# Patient Record
Sex: FEMALE | Race: WHITE | NOT HISPANIC OR LATINO | Employment: OTHER | URBAN - METROPOLITAN AREA
[De-identification: names, ages, dates, MRNs, and addresses within clinical notes are randomized per-mention and may not be internally consistent; named-entity substitution may affect disease eponyms.]

---
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
## 2023-09-11 PROBLEM — R40.1 CLOUDED CONSCIOUSNESS: Status: ACTIVE | Noted: 2023-09-11

## 2023-09-11 PROBLEM — R93.3 IMAGING OF GASTROINTESTINAL TRACT ABNORMAL: Status: ACTIVE | Noted: 2023-09-11

## 2023-09-11 PROBLEM — M77.12 LATERAL EPICONDYLITIS OF LEFT ELBOW: Status: ACTIVE | Noted: 2022-10-25

## 2023-09-11 PROBLEM — R42 DIZZY: Status: ACTIVE | Noted: 2022-08-11

## 2023-09-11 PROBLEM — K21.9 GASTROESOPHAGEAL REFLUX DISEASE: Status: ACTIVE | Noted: 2023-09-11

## 2023-09-11 PROBLEM — I83.93 VARICOSE VEINS OF LEGS: Status: ACTIVE | Noted: 2019-05-29

## 2023-09-11 PROBLEM — K29.90 GASTRODUODENITIS: Status: ACTIVE | Noted: 2023-09-11

## 2023-09-11 PROBLEM — J06.9 ACUTE URI: Status: RESOLVED | Noted: 2018-11-08 | Resolved: 2023-09-11

## 2023-09-11 PROBLEM — J06.9 VIRAL UPPER RESPIRATORY INFECTION: Status: RESOLVED | Noted: 2020-11-12 | Resolved: 2023-09-11

## 2023-09-11 PROBLEM — K59.00 CONSTIPATION: Status: ACTIVE | Noted: 2021-06-17

## 2023-09-11 PROBLEM — J98.11 ATELECTASIS: Status: ACTIVE | Noted: 2023-09-11

## 2023-09-11 PROBLEM — J00 NASOPHARYNGITIS: Status: ACTIVE | Noted: 2020-02-27

## 2023-09-11 PROBLEM — D13.1 BENIGN NEOPLASM OF STOMACH: Status: ACTIVE | Noted: 2023-09-11

## 2023-09-11 PROBLEM — C50.419 MALIGNANT NEOPLASM OF UPPER-OUTER QUADRANT OF FEMALE BREAST (MULTI): Status: ACTIVE | Noted: 2023-09-11

## 2023-09-11 PROBLEM — D12.5 BENIGN NEOPLASM OF SIGMOID COLON: Status: ACTIVE | Noted: 2023-09-11

## 2023-09-11 PROBLEM — Z86.010 HISTORY OF COLONIC POLYPS: Status: ACTIVE | Noted: 2023-09-11

## 2023-09-11 PROBLEM — K44.9 DIAPHRAGMATIC HERNIA: Status: ACTIVE | Noted: 2023-09-11

## 2023-09-11 PROBLEM — M67.441 GANGLION CYST OF TENDON SHEATH OF RIGHT HAND: Status: ACTIVE | Noted: 2021-10-28

## 2023-09-11 PROBLEM — E78.5 HYPERLIPIDEMIA: Status: ACTIVE | Noted: 2019-03-20

## 2023-09-11 PROBLEM — I48.91 RAPID ATRIAL FIBRILLATION (MULTI): Status: RESOLVED | Noted: 2023-09-11 | Resolved: 2023-09-11

## 2023-09-11 PROBLEM — L82.1 SEBORRHEIC KERATOSES: Status: ACTIVE | Noted: 2018-11-08

## 2023-09-11 PROBLEM — H57.9 LEFT EYE COMPLAINT: Status: ACTIVE | Noted: 2020-08-08

## 2023-09-11 PROBLEM — S61.439A: Status: RESOLVED | Noted: 2018-08-27 | Resolved: 2023-09-11

## 2023-09-11 PROBLEM — R10.9 ABDOMINAL PAIN: Status: ACTIVE | Noted: 2023-09-11

## 2023-09-11 PROBLEM — E55.9 VITAMIN D DEFICIENCY: Status: ACTIVE | Noted: 2023-09-11

## 2023-09-11 PROBLEM — M25.521 RIGHT ELBOW PAIN: Status: RESOLVED | Noted: 2019-10-03 | Resolved: 2023-09-11

## 2023-09-11 PROBLEM — T78.40XA ALLERGIES: Status: ACTIVE | Noted: 2020-03-18

## 2023-09-11 PROBLEM — J96.00 ACUTE RESPIRATORY FAILURE (MULTI): Status: RESOLVED | Noted: 2023-09-11 | Resolved: 2023-09-11

## 2023-09-11 PROBLEM — R32 URINARY INCONTINENCE: Status: ACTIVE | Noted: 2019-05-30

## 2023-09-11 PROBLEM — S61.432A PUNCTURE WOUND OF LEFT HAND: Status: RESOLVED | Noted: 2018-08-27 | Resolved: 2023-09-11

## 2023-09-11 PROBLEM — I48.0 PAROXYSMAL ATRIAL FIBRILLATION (MULTI): Status: ACTIVE | Noted: 2023-09-11

## 2023-09-11 PROBLEM — H10.31 ACUTE CONJUNCTIVITIS, RIGHT EYE: Status: RESOLVED | Noted: 2019-10-12 | Resolved: 2023-09-11

## 2023-09-11 PROBLEM — J20.9 ACUTE BRONCHITIS: Status: RESOLVED | Noted: 2019-04-08 | Resolved: 2023-09-11

## 2023-09-11 PROBLEM — L84 CORNS/CALLOSITIES: Status: RESOLVED | Noted: 2023-04-13 | Resolved: 2023-09-11

## 2023-09-11 PROBLEM — R22.41 LOCALIZED SWELLING OF RIGHT LOWER LEG: Status: ACTIVE | Noted: 2021-04-24

## 2023-09-11 PROBLEM — R06.09 DYSPNEA ON EXERTION: Status: ACTIVE | Noted: 2023-09-11

## 2023-09-11 PROBLEM — I10 ESSENTIAL (PRIMARY) HYPERTENSION: Status: ACTIVE | Noted: 2018-10-10

## 2023-09-11 PROBLEM — H81.10 BENIGN POSITIONAL VERTIGO: Status: ACTIVE | Noted: 2021-01-25

## 2023-09-11 PROBLEM — H69.90 EUSTACHIAN TUBE DYSFUNCTION: Status: ACTIVE | Noted: 2019-06-14

## 2023-09-11 PROBLEM — E21.3 HYPERPARATHYROIDISM (MULTI): Status: ACTIVE | Noted: 2023-09-11

## 2023-09-11 PROBLEM — K29.71 HEMORRHAGIC GASTRITIS: Status: ACTIVE | Noted: 2023-09-11

## 2023-09-11 PROBLEM — Z86.0100 HISTORY OF COLONIC POLYPS: Status: ACTIVE | Noted: 2023-09-11
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
## 2023-09-11 PROBLEM — J30.1 ALLERGIC RHINITIS DUE TO POLLEN: Status: ACTIVE | Noted: 2020-06-11

## 2023-09-11 PROBLEM — R53.82 CHRONIC FATIGUE: Status: ACTIVE | Noted: 2023-09-11

## 2023-09-11 PROBLEM — D12.8 BENIGN NEOPLASM OF RECTUM: Status: ACTIVE | Noted: 2023-09-11

## 2023-09-11 PROBLEM — H35.30 AGE-RELATED MACULAR DEGENERATION: Status: ACTIVE | Noted: 2023-09-11

## 2023-09-11 PROBLEM — J90 BILATERAL PLEURAL EFFUSION: Status: ACTIVE | Noted: 2023-09-11

## 2023-09-11 PROBLEM — R11.0 NAUSEA: Status: ACTIVE | Noted: 2021-01-25

## 2023-09-11 PROBLEM — M79.661 PAIN IN RIGHT LOWER LEG: Status: ACTIVE | Noted: 2021-04-24

## 2023-09-11 PROBLEM — M20.42 HAMMERTOE OF SECOND TOE OF LEFT FOOT: Status: ACTIVE | Noted: 2023-04-13

## 2023-09-11 PROBLEM — R05.9 COUGH: Status: RESOLVED | Noted: 2019-04-07 | Resolved: 2023-09-11

## 2023-09-11 PROBLEM — U07.1 COVID-19 VIRUS INFECTION: Status: RESOLVED | Noted: 2022-08-01 | Resolved: 2023-09-11

## 2023-09-11 RX ORDER — LORATADINE 10 MG/1
10 TABLET ORAL DAILY
COMMUNITY
Start: 2022-10-25

## 2023-09-11 RX ORDER — OMEPRAZOLE 40 MG/1
1 CAPSULE, DELAYED RELEASE ORAL DAILY
COMMUNITY
End: 2023-12-21

## 2023-09-11 RX ORDER — GINGER ROOT 550 MG
1 CAPSULE ORAL AS NEEDED
COMMUNITY

## 2023-09-11 RX ORDER — FERROUS SULFATE, DRIED 160(50) MG
TABLET, EXTENDED RELEASE ORAL
COMMUNITY
Start: 2018-08-27

## 2023-09-11 RX ORDER — HYDROCHLOROTHIAZIDE 25 MG/1
1 TABLET ORAL EVERY MORNING
COMMUNITY
End: 2023-12-21

## 2023-09-11 RX ORDER — AMLODIPINE BESYLATE 5 MG/1
1 TABLET ORAL DAILY
COMMUNITY
Start: 2020-02-20

## 2023-09-11 RX ORDER — GUAIFENESIN 600 MG/1
1 TABLET, EXTENDED RELEASE ORAL EVERY 12 HOURS PRN
COMMUNITY

## 2023-09-11 RX ORDER — GLUCOSAMINE/CHONDR SU A SOD 750-600 MG
1 TABLET ORAL
COMMUNITY

## 2023-09-11 RX ORDER — ONDANSETRON 4 MG/1
1 TABLET, FILM COATED ORAL EVERY 8 HOURS PRN
COMMUNITY
End: 2023-12-21 | Stop reason: ALTCHOICE

## 2023-09-11 RX ORDER — DIAPER,BRIEF,INFANT-TODD,DISP
1 EACH MISCELLANEOUS 2 TIMES DAILY
COMMUNITY
End: 2023-12-21 | Stop reason: SDUPTHER

## 2023-09-11 RX ORDER — CALCIUM CITRATE/VITAMIN D3 315MG-6.25
1 TABLET ORAL 2 TIMES DAILY
COMMUNITY
End: 2023-12-21 | Stop reason: SDUPTHER

## 2023-12-21 ENCOUNTER — OFFICE VISIT (OUTPATIENT)
Dept: PRIMARY CARE | Facility: CLINIC | Age: 78
End: 2023-12-21
Payer: MEDICARE

## 2023-12-21 VITALS
HEART RATE: 72 BPM | SYSTOLIC BLOOD PRESSURE: 146 MMHG | OXYGEN SATURATION: 95 % | BODY MASS INDEX: 24.35 KG/M2 | DIASTOLIC BLOOD PRESSURE: 70 MMHG | TEMPERATURE: 97.1 F | WEIGHT: 131 LBS

## 2023-12-21 DIAGNOSIS — J06.9 VIRAL UPPER RESPIRATORY TRACT INFECTION: Primary | ICD-10-CM

## 2023-12-21 PROCEDURE — 1160F RVW MEDS BY RX/DR IN RCRD: CPT | Performed by: FAMILY MEDICINE

## 2023-12-21 PROCEDURE — 99213 OFFICE O/P EST LOW 20 MIN: CPT | Performed by: FAMILY MEDICINE

## 2023-12-21 PROCEDURE — 1126F AMNT PAIN NOTED NONE PRSNT: CPT | Performed by: FAMILY MEDICINE

## 2023-12-21 PROCEDURE — 1036F TOBACCO NON-USER: CPT | Performed by: FAMILY MEDICINE

## 2023-12-21 PROCEDURE — 3077F SYST BP >= 140 MM HG: CPT | Performed by: FAMILY MEDICINE

## 2023-12-21 PROCEDURE — 1159F MED LIST DOCD IN RCRD: CPT | Performed by: FAMILY MEDICINE

## 2023-12-21 PROCEDURE — 3078F DIAST BP <80 MM HG: CPT | Performed by: FAMILY MEDICINE

## 2023-12-21 ASSESSMENT — PAIN SCALES - GENERAL: PAINLEVEL: 0-NO PAIN

## 2023-12-21 ASSESSMENT — PATIENT HEALTH QUESTIONNAIRE - PHQ9
SUM OF ALL RESPONSES TO PHQ9 QUESTIONS 1 AND 2: 0
2. FEELING DOWN, DEPRESSED OR HOPELESS: NOT AT ALL
1. LITTLE INTEREST OR PLEASURE IN DOING THINGS: NOT AT ALL

## 2023-12-21 NOTE — PATIENT INSTRUCTIONS
You have an early onset cold.  I recommend Mucinex and Tylenol as needed.  You should increase your intake of clear liquids.

## 2023-12-21 NOTE — PROGRESS NOTES
Subjective   Patient ID: Gloria Echavarria is a 78 y.o. female who presents for Sick Visit (Pt c/o cough, sore throat, chest congestion and discomfort since last night.).    HPI   Gloria presents with sore throat that started last night and a dry cough.  Her sore throat feels better today.  She now has some nasal congestion and some tightness in her chest.  She has not taken anything for her symptoms.  She does not smoke and has no history of asthma.      Review of Systems  She denies fever or chills.  No loss of sense of taste or smell.  No decreased appetite.  Her cough is nonproductive.  No shortness of breath.  Objective   /70   Pulse 72   Temp 36.2 °C (97.1 °F)   Wt 59.4 kg (131 lb)   SpO2 95%   BMI 24.35 kg/m²     Physical Exam  She is alert and in no distress.  Ears TMs are clear.  Nose is congested with a scant amount of clear rhinorrhea.  Throat is noninjected and without exudate.  Neck is supple without adenopathy.  Lungs are clear.  Heart is regular in rhythm and rate.  Skin shows normal turgor and mucous membranes are moist.    Assessment/Plan   Diagnoses and all orders for this visit:  Viral upper respiratory tract infection  She has an early viral type infection.  It is very early in the course.  I warned her her symptoms may get a little worse before they get better.  Symptoms will likely last about a week.  She may use Tylenol and Mucinex as needed.  She should increase her intake of clear liquids.

## 2024-06-04 ENCOUNTER — TELEPHONE (OUTPATIENT)
Dept: PRIMARY CARE | Facility: CLINIC | Age: 79
End: 2024-06-04
Payer: MEDICARE

## 2024-06-04 DIAGNOSIS — I10 ESSENTIAL (PRIMARY) HYPERTENSION: ICD-10-CM

## 2024-06-04 DIAGNOSIS — E55.9 VITAMIN D DEFICIENCY: ICD-10-CM

## 2024-06-04 DIAGNOSIS — E78.5 HYPERLIPIDEMIA, UNSPECIFIED HYPERLIPIDEMIA TYPE: ICD-10-CM

## 2024-06-04 DIAGNOSIS — Z12.31 VISIT FOR SCREENING MAMMOGRAM: Primary | ICD-10-CM

## 2024-06-12 ENCOUNTER — APPOINTMENT (OUTPATIENT)
Dept: RADIOLOGY | Facility: CLINIC | Age: 79
End: 2024-06-12
Payer: MEDICARE

## 2024-06-12 ENCOUNTER — TELEPHONE (OUTPATIENT)
Dept: PRIMARY CARE | Facility: CLINIC | Age: 79
End: 2024-06-12
Payer: MEDICARE

## 2024-06-12 ENCOUNTER — HOSPITAL ENCOUNTER (OUTPATIENT)
Dept: RADIOLOGY | Facility: CLINIC | Age: 79
Discharge: HOME | End: 2024-06-12
Payer: MEDICARE

## 2024-06-12 VITALS — HEIGHT: 62 IN | BODY MASS INDEX: 23.92 KG/M2 | WEIGHT: 130 LBS

## 2024-06-12 DIAGNOSIS — Z12.31 VISIT FOR SCREENING MAMMOGRAM: ICD-10-CM

## 2024-06-12 NOTE — TELEPHONE ENCOUNTER
Patient came in because  refused to perform her mammogram since she had a double mastectomy - they told her this is a new  policy. They told her she could check with her doctor to see if anything could be done in order for her to get the mammogram. Please advise.  Patient phone #: [phone]

## 2024-06-13 DIAGNOSIS — Z85.3 HISTORY OF BREAST CANCER: Primary | ICD-10-CM

## 2024-06-14 ENCOUNTER — TELEPHONE (OUTPATIENT)
Dept: PRIMARY CARE | Facility: CLINIC | Age: 79
End: 2024-06-14
Payer: MEDICARE

## 2024-06-14 NOTE — TELEPHONE ENCOUNTER
Patient came in the other day about her canceled mammogram. She keeps getting the automated calls about scheduling the mammogram and she was wondering if she should wait to schedule until Dr. De La O's office gets back to her, or if she should reschedule it now?    Contact: [phone]

## 2024-06-25 ENCOUNTER — HOSPITAL ENCOUNTER (OUTPATIENT)
Dept: RADIOLOGY | Facility: HOSPITAL | Age: 79
Discharge: HOME | End: 2024-06-25
Payer: MEDICARE

## 2024-06-25 VITALS — HEIGHT: 62 IN | WEIGHT: 130 LBS | BODY MASS INDEX: 23.92 KG/M2

## 2024-06-25 PROCEDURE — 77067 SCR MAMMO BI INCL CAD: CPT | Performed by: STUDENT IN AN ORGANIZED HEALTH CARE EDUCATION/TRAINING PROGRAM

## 2024-06-25 PROCEDURE — 77063 BREAST TOMOSYNTHESIS BI: CPT | Performed by: STUDENT IN AN ORGANIZED HEALTH CARE EDUCATION/TRAINING PROGRAM

## 2024-06-25 PROCEDURE — 77067 SCR MAMMO BI INCL CAD: CPT

## 2024-07-09 NOTE — PROGRESS NOTES
Subjective      No chief complaint on file.         She was seen a year ago history of hypertension and she had atrial fibrillation.  The atrial fibrillation was after hiatal hernia surgery in 2018.   In 2018 she had a very extensive repair of a hiatal hernia and had to go into the thoracic cavity.  She did go into atrial fibrillation and converted on Cardizem drip.  Her [id number] score is 5 recommend she be on anticoagulation at that time.  Surgery felt at that time she could not tolerate it.  She was monitored for 3 weeks and had no further episodes.  It was felt that the atrial fibrillation was secondary to inflammation she had been placed on amiodarone at the time and this has been stopped.  She has had no further episodes of the atrial fibrillation since then.  Is been felt that she does not need long-term anticoagulation unless she has another episodes of atrial fibrillation.  She is having some problems with diarrhea.  Otherwise is doing well.  She is not complaining of chest discomfort.  No complaints of PND or orthopnea.  The legs are not swelling on her.  NO palpitaions.  No dizziness or lightheadedness.  The BP is doing well at home and is up today and is upset today           Review of Systems   Constitutional: Negative. Negative for chills and fever.   HENT: Negative.     Eyes: Negative.    Cardiovascular:  Negative for near-syncope.   Respiratory: Negative.  Negative for cough.    Endocrine: Negative.    Skin: Negative.    Musculoskeletal:  Positive for back pain. Negative for falls.   Gastrointestinal:  Positive for diarrhea.   Genitourinary: Negative.    Neurological: Negative.    All other systems reviewed and are negative.       Past Surgical History:   Procedure Laterality Date    HYSTERECTOMY      MASTECTOMY Bilateral     left breast cancer  mastectomy with implants        Active Ambulatory Problems     Diagnosis Date Noted    Hemorrhagic gastritis 09/11/2023    Abdominal pain 09/11/2023     Age-related macular degeneration 09/11/2023    Allergic rhinitis due to pollen 06/11/2020    Allergies 03/18/2020    Atelectasis 09/11/2023    Benign neoplasm of rectum 09/11/2023    Benign neoplasm of sigmoid colon 09/11/2023    Benign neoplasm of stomach 09/11/2023    Benign positional vertigo 01/25/2021    Bilateral pleural effusion 09/11/2023    Chronic fatigue 09/11/2023    Clouded consciousness 09/11/2023    Constipation 06/17/2021    Nausea 01/25/2021    Seborrheic keratoses 11/08/2018    Diaphragmatic hernia 09/11/2023    Dizzy 08/11/2022    Dyspnea on exertion 09/11/2023    Essential (primary) hypertension 10/10/2018    Eustachian tube dysfunction 06/14/2019    Ganglion cyst of tendon sheath of right hand 10/28/2021    Gastroesophageal reflux disease 09/11/2023    Gastroduodenitis 09/11/2023    Hammertoe of second toe of left foot 04/13/2023    History of colonic polyps 09/11/2023    Hyperlipidemia 03/20/2019    Hyperparathyroidism (Multi) 09/11/2023    Imaging of gastrointestinal tract abnormal 09/11/2023    Lateral epicondylitis of left elbow 10/25/2022    Left eye complaint 08/08/2020    Localized swelling of right lower leg 04/24/2021    Malignant neoplasm of upper-outer quadrant of female breast (Multi) 09/11/2023    Nasopharyngitis 02/27/2020    Pain in right lower leg 04/24/2021    Paroxysmal atrial fibrillation (Multi) 09/11/2023    Urinary incontinence 05/30/2019    Varicose veins of legs 05/29/2019    Vitamin D deficiency 09/11/2023     Resolved Ambulatory Problems     Diagnosis Date Noted    Acute bronchitis 04/08/2019    Acute respiratory failure (Multi) 09/11/2023    Acute conjunctivitis, right eye 10/12/2019    Acute URI 11/08/2018    Viral upper respiratory infection 11/12/2020    Bitten by cat, initial encounter 08/27/2018    Cat bite 08/27/2018    Corns/callosities 04/13/2023    COVID-19 virus infection 08/01/2022    Cough 04/07/2019    Rapid atrial fibrillation (Multi) 09/11/2023     "Puncture wound of hand without foreign body 08/27/2018    Puncture wound of left hand 08/27/2018    Right elbow pain 10/03/2019     Past Medical History:   Diagnosis Date    Breast cancer (Multi)         Visit Vitals  OB Status Hysterectomy   Smoking Status Never        Objective     Constitutional:       Appearance: Healthy appearance.   Neck:      Vascular: No JVR.   Pulmonary:      Effort: Pulmonary effort is normal.      Breath sounds: Normal breath sounds.   Cardiovascular:      PMI at left midclavicular line. Normal rate. Regular rhythm. Normal S1. Normal S2.       Murmurs: There is no murmur.      No gallop.  No click. No rub.   Pulses:     Intact distal pulses.   Abdominal:      Palpations: Abdomen is soft.   Musculoskeletal: Normal range of motion. Skin:     General: Skin is warm and dry.   Neurological:      General: No focal deficit present.            Lab Review:         Lab Results   Component Value Date    CHOL 195 07/17/2023    CHOL 195 06/21/2022    CHOL 196 06/10/2021     Lab Results   Component Value Date    HDL 86 07/17/2023    HDL 84 06/21/2022    HDL 84 06/10/2021     Lab Results   Component Value Date    LDLCALC 92 07/17/2023    LDLCALC 93 06/21/2022    LDLCALC 84 06/10/2021     Lab Results   Component Value Date    TRIG 86 07/17/2023    TRIG 92 06/21/2022    TRIG 142 06/10/2021     No components found for: \"CHOLHDL\"     Assessment/Plan     No problem-specific Assessment & Plan notes found for this encounter.     "

## 2024-07-11 ENCOUNTER — OFFICE VISIT (OUTPATIENT)
Dept: CARDIOLOGY | Facility: CLINIC | Age: 79
End: 2024-07-11
Payer: MEDICARE

## 2024-07-11 VITALS
WEIGHT: 130 LBS | HEART RATE: 80 BPM | OXYGEN SATURATION: 98 % | DIASTOLIC BLOOD PRESSURE: 70 MMHG | SYSTOLIC BLOOD PRESSURE: 148 MMHG | BODY MASS INDEX: 23.78 KG/M2

## 2024-07-11 DIAGNOSIS — I48.0 PAROXYSMAL ATRIAL FIBRILLATION (MULTI): ICD-10-CM

## 2024-07-11 DIAGNOSIS — I10 ESSENTIAL (PRIMARY) HYPERTENSION: Primary | ICD-10-CM

## 2024-07-11 PROCEDURE — 1159F MED LIST DOCD IN RCRD: CPT | Performed by: INTERNAL MEDICINE

## 2024-07-11 PROCEDURE — 3077F SYST BP >= 140 MM HG: CPT | Performed by: INTERNAL MEDICINE

## 2024-07-11 PROCEDURE — 99213 OFFICE O/P EST LOW 20 MIN: CPT | Performed by: INTERNAL MEDICINE

## 2024-07-11 PROCEDURE — 1160F RVW MEDS BY RX/DR IN RCRD: CPT | Performed by: INTERNAL MEDICINE

## 2024-07-11 PROCEDURE — 1126F AMNT PAIN NOTED NONE PRSNT: CPT | Performed by: INTERNAL MEDICINE

## 2024-07-11 PROCEDURE — 1036F TOBACCO NON-USER: CPT | Performed by: INTERNAL MEDICINE

## 2024-07-11 PROCEDURE — 3078F DIAST BP <80 MM HG: CPT | Performed by: INTERNAL MEDICINE

## 2024-07-11 RX ORDER — AMLODIPINE BESYLATE 5 MG/1
5 TABLET ORAL DAILY
Qty: 90 TABLET | Refills: 3 | Status: SHIPPED | OUTPATIENT
Start: 2024-07-11 | End: 2025-07-11

## 2024-07-11 ASSESSMENT — ENCOUNTER SYMPTOMS
OCCASIONAL FEELINGS OF UNSTEADINESS: 0
LOSS OF SENSATION IN FEET: 0
FALLS: 0
BACK PAIN: 1
NEUROLOGICAL NEGATIVE: 1
ENDOCRINE NEGATIVE: 1
COUGH: 0
NEAR-SYNCOPE: 0
RESPIRATORY NEGATIVE: 1
DIARRHEA: 1
CHILLS: 0
DEPRESSION: 0
EYES NEGATIVE: 1
FEVER: 0
CONSTITUTIONAL NEGATIVE: 1

## 2024-07-11 ASSESSMENT — PATIENT HEALTH QUESTIONNAIRE - PHQ9
1. LITTLE INTEREST OR PLEASURE IN DOING THINGS: NOT AT ALL
SUM OF ALL RESPONSES TO PHQ9 QUESTIONS 1 AND 2: 0
2. FEELING DOWN, DEPRESSED OR HOPELESS: NOT AT ALL

## 2024-07-11 ASSESSMENT — PAIN SCALES - GENERAL: PAINLEVEL: 0-NO PAIN

## 2024-07-11 NOTE — ASSESSMENT & PLAN NOTE
She is doing well and no angina and no chf.  The BP is up today and is under stress.  Will check at home and in 2 weeks.  She is to have a routine physical next month and if still up need to adjust the meds.

## 2024-07-11 NOTE — ASSESSMENT & PLAN NOTE
Has not felt the afib since 2018 and have no plan to start anticoagulation mario with her GI problems

## 2024-07-23 ENCOUNTER — LAB (OUTPATIENT)
Dept: LAB | Facility: LAB | Age: 79
End: 2024-07-23
Payer: MEDICARE

## 2024-07-23 DIAGNOSIS — I10 ESSENTIAL (PRIMARY) HYPERTENSION: ICD-10-CM

## 2024-07-23 DIAGNOSIS — E78.5 HYPERLIPIDEMIA, UNSPECIFIED HYPERLIPIDEMIA TYPE: ICD-10-CM

## 2024-07-23 DIAGNOSIS — E55.9 VITAMIN D DEFICIENCY: ICD-10-CM

## 2024-07-23 LAB
25(OH)D3 SERPL-MCNC: 39 NG/ML (ref 30–100)
ALBUMIN SERPL BCP-MCNC: 4.1 G/DL (ref 3.4–5)
ALP SERPL-CCNC: 80 U/L (ref 33–136)
ALT SERPL W P-5'-P-CCNC: 24 U/L (ref 7–45)
ANION GAP SERPL CALC-SCNC: 12 MMOL/L (ref 10–20)
APPEARANCE UR: ABNORMAL
AST SERPL W P-5'-P-CCNC: 28 U/L (ref 9–39)
BACTERIA #/AREA URNS AUTO: ABNORMAL /HPF
BASOPHILS # BLD AUTO: 0.04 X10*3/UL (ref 0–0.1)
BASOPHILS NFR BLD AUTO: 0.6 %
BILIRUB SERPL-MCNC: 0.6 MG/DL (ref 0–1.2)
BILIRUB UR STRIP.AUTO-MCNC: NEGATIVE MG/DL
BUN SERPL-MCNC: 15 MG/DL (ref 6–23)
CALCIUM SERPL-MCNC: 9.6 MG/DL (ref 8.6–10.3)
CHLORIDE SERPL-SCNC: 106 MMOL/L (ref 98–107)
CHOLEST SERPL-MCNC: 193 MG/DL (ref 0–199)
CHOLESTEROL/HDL RATIO: 2.4
CO2 SERPL-SCNC: 26 MMOL/L (ref 21–32)
COLOR UR: ABNORMAL
CREAT SERPL-MCNC: 0.67 MG/DL (ref 0.5–1.05)
CREAT UR-MCNC: 121.1 MG/DL (ref 20–320)
EGFRCR SERPLBLD CKD-EPI 2021: 90 ML/MIN/1.73M*2
EOSINOPHIL # BLD AUTO: 0.07 X10*3/UL (ref 0–0.4)
EOSINOPHIL NFR BLD AUTO: 1 %
ERYTHROCYTE [DISTWIDTH] IN BLOOD BY AUTOMATED COUNT: 13.9 % (ref 11.5–14.5)
GLUCOSE SERPL-MCNC: 96 MG/DL (ref 74–99)
GLUCOSE UR STRIP.AUTO-MCNC: NORMAL MG/DL
HCT VFR BLD AUTO: 45.7 % (ref 36–46)
HDLC SERPL-MCNC: 79.2 MG/DL
HGB BLD-MCNC: 14.6 G/DL (ref 12–16)
IMM GRANULOCYTES # BLD AUTO: 0.02 X10*3/UL (ref 0–0.5)
IMM GRANULOCYTES NFR BLD AUTO: 0.3 % (ref 0–0.9)
KETONES UR STRIP.AUTO-MCNC: NEGATIVE MG/DL
LDLC SERPL CALC-MCNC: 100 MG/DL
LEUKOCYTE ESTERASE UR QL STRIP.AUTO: ABNORMAL
LYMPHOCYTES # BLD AUTO: 2.74 X10*3/UL (ref 0.8–3)
LYMPHOCYTES NFR BLD AUTO: 38.9 %
MCH RBC QN AUTO: 30 PG (ref 26–34)
MCHC RBC AUTO-ENTMCNC: 31.9 G/DL (ref 32–36)
MCV RBC AUTO: 94 FL (ref 80–100)
MICROALBUMIN UR-MCNC: 8.9 MG/L
MICROALBUMIN/CREAT UR: 7.3 UG/MG CREAT
MONOCYTES # BLD AUTO: 0.48 X10*3/UL (ref 0.05–0.8)
MONOCYTES NFR BLD AUTO: 6.8 %
MUCOUS THREADS #/AREA URNS AUTO: ABNORMAL /LPF
NEUTROPHILS # BLD AUTO: 3.7 X10*3/UL (ref 1.6–5.5)
NEUTROPHILS NFR BLD AUTO: 52.4 %
NITRITE UR QL STRIP.AUTO: NEGATIVE
NON HDL CHOLESTEROL: 114 MG/DL (ref 0–149)
NRBC BLD-RTO: 0 /100 WBCS (ref 0–0)
PH UR STRIP.AUTO: 5.5 [PH]
PLATELET # BLD AUTO: 297 X10*3/UL (ref 150–450)
POTASSIUM SERPL-SCNC: 4 MMOL/L (ref 3.5–5.3)
PROT SERPL-MCNC: 6.8 G/DL (ref 6.4–8.2)
PROT UR STRIP.AUTO-MCNC: NEGATIVE MG/DL
RBC # BLD AUTO: 4.87 X10*6/UL (ref 4–5.2)
RBC # UR STRIP.AUTO: NEGATIVE /UL
RBC #/AREA URNS AUTO: >20 /HPF
SODIUM SERPL-SCNC: 140 MMOL/L (ref 136–145)
SP GR UR STRIP.AUTO: 1.01
SQUAMOUS #/AREA URNS AUTO: ABNORMAL /HPF
TRIGL SERPL-MCNC: 68 MG/DL (ref 0–149)
UROBILINOGEN UR STRIP.AUTO-MCNC: NORMAL MG/DL
VLDL: 14 MG/DL (ref 0–40)
WBC # BLD AUTO: 7.1 X10*3/UL (ref 4.4–11.3)
WBC #/AREA URNS AUTO: ABNORMAL /HPF
WBC CLUMPS #/AREA URNS AUTO: ABNORMAL /HPF
YEAST BUDDING #/AREA UR COMP ASSIST: PRESENT /HPF

## 2024-07-23 PROCEDURE — 81001 URINALYSIS AUTO W/SCOPE: CPT

## 2024-07-23 PROCEDURE — 85025 COMPLETE CBC W/AUTO DIFF WBC: CPT

## 2024-07-23 PROCEDURE — 80053 COMPREHEN METABOLIC PANEL: CPT

## 2024-07-23 PROCEDURE — 80061 LIPID PANEL: CPT

## 2024-07-23 PROCEDURE — 82043 UR ALBUMIN QUANTITATIVE: CPT

## 2024-07-23 PROCEDURE — 36415 COLL VENOUS BLD VENIPUNCTURE: CPT

## 2024-07-23 PROCEDURE — 82570 ASSAY OF URINE CREATININE: CPT

## 2024-07-23 PROCEDURE — 82306 VITAMIN D 25 HYDROXY: CPT

## 2024-07-30 PROBLEM — K29.90 GASTRODUODENITIS: Status: RESOLVED | Noted: 2023-09-11 | Resolved: 2024-07-30

## 2024-07-30 PROBLEM — R11.0 NAUSEA: Status: RESOLVED | Noted: 2021-01-25 | Resolved: 2024-07-30

## 2024-07-30 PROBLEM — H69.90 EUSTACHIAN TUBE DYSFUNCTION: Status: RESOLVED | Noted: 2019-06-14 | Resolved: 2024-07-30

## 2024-07-30 PROBLEM — R10.9 ABDOMINAL PAIN: Status: RESOLVED | Noted: 2023-09-11 | Resolved: 2024-07-30

## 2024-07-30 PROBLEM — R06.09 DYSPNEA ON EXERTION: Status: RESOLVED | Noted: 2023-09-11 | Resolved: 2024-07-30

## 2024-07-30 PROBLEM — K59.00 CONSTIPATION: Status: RESOLVED | Noted: 2021-06-17 | Resolved: 2024-07-30

## 2024-07-30 PROBLEM — J00 NASOPHARYNGITIS: Status: RESOLVED | Noted: 2020-02-27 | Resolved: 2024-07-30

## 2024-07-30 NOTE — PROGRESS NOTES
Subjective   Reason for Visit: Gloria Echavarria is an 78 y.o. female here for a Medicare Wellness visit.     Past Medical, Surgical, and Family History reviewed and updated in chart.    Reviewed all medications by prescribing practitioner or clinical pharmacist (such as prescriptions, OTCs, herbal therapies and supplements) and documented in the medical record.    [facility]    Patient Care Team:  Estefany Jaimes MD as PCP - General  Estefany Jaimes MD as PCP - [employer] PCP  Estefany Jaimes MD     GLORIA is seen for for her comprehensive physical exam. She lives by herself.   She had a colonoscopy 2019 by Dr. De La O. Follow-up was recommended in 5 years. She had a normal bone density 2019. She has a history of breast cancer, diagnosed over 20 years ago. She has done well and is no longer seeing the specialist.   She has had a bilateral mastectomy with implants. Her tetanus is current done in 2018. She has had her Shingrix vaccine.   She had a mammogram in June 2024.  She has allergic rhinitis.   Previously was taking Claritin but no longer feels she needs that.She has chronic urinary frequency and has seen Dr. Daniel for this. He had recommended Botox which she has declined.    She has a lump behind her right knee that she recently noticed.  It does not bother her.  She has a spot that itches on the inside of her right ankle for the past couple of days.  she occasionally gets some discomfort on the medial side of her right knee.  It does not wake her up at night or prevent her from doing activities.    She has chronic constipation.  She does take fiber and has used MiraLAX.  Pre draw is reviewed and discussed. All looks healthy.   Vitamin D level is 39.  Consultants:   1. Dermatologist for annual skin survey.   2. Dr. Vigil .  She also sees a retina specialist.   3. Dr. Bean - She sees him for hypertension and is on amlodipine.        Review of Systems  Constitutional Symptoms:  "negative for loss of appetite, headaches, fatigue or dizziness. Denies recent illness  Eyes: negative for blurring of vision  Ear, Nose, Mouth, Throat: negative for hearing loss, tinnitus.  Cardiovascular: negative for chest pain, palpitations, edema, claudication.   Respiratory: negative for shortness of breath, dyspnea on exertion, coughing.   Gastrointestinal: negative for  abdominal pain, change in bowel habits, blood in stool.   She occasionally gets indigestion.  She has taken Pepcid for this with relief.  Musculoskeletal: negative for joint swelling, myalgias, cramps.   Integumentary: negative for change in mole or rash.   Neurological: negative for numbness, tingling, weakness, tremors.   Psychiatric: negative for depression, anxiety.   Allergic: negative for eczema. She does have year-round allergic symptoms.    Objective   Vitals:  /64 (BP Location: Left arm)   Pulse 74   Ht 1.555 m (5' 1.22\")   Wt 58.8 kg (129 lb 9.6 oz)   SpO2 94%   BMI 24.31 kg/m²       Physical Exam  General Appearance: Comfortable. She is well nourished, and well developed.   Eyes: PERRLA, EOMI, conjunctiva and sclerae clear.   Ears: Bilateral canals are normal. Both tympanic membranes are pearly gray and landmarks are normal .   Nose is not congested. Throat is noninjected without exudate.  Neck: Neck is supple, no adenopathy or thyromegaly. No carotid bruits.   Breasts: She has bilateral breast implants. There is no abnormality of the breast exam. No axillary adenopathy.  Chest: Lungs are clear to auscultation bilaterally with no wheezes, rales, or rhonchi.  Cardiovascular: RRR without MRG. Peripheral pulses are intact. Extremities without edema.  Abdomen: Abdomen is soft, NT, ND with no masses or organomegaly.  Lymph Nodes:  Bilateral inguinal lymph nodes are unremarkable.  Musculoskeletal: 5/5 and equal strength in bilateral upper and lower extremities.   No tenderness or instability of the right knee.  Skin: Skin shows " good turgor and no rashes .   She has a small skin tag behind her right knee which is noninflamed.  No rash in the area where she is having some itching.   She has multiple seborrheic keratosis on her trunk.  She has non tender varicosities of the lower extremities.  Neurological: Neuro: Intact and non-focal. Cranial nerves II - XII are grossly intact.  Psychiatric: Patient's mood is appropriate.    Assessment/Plan   Problem List Items Addressed This Visit             ICD-10-CM    Essential (primary) hypertension I10     Other Visit Diagnoses         Codes    Routine general medical examination at health care facility    -  Primary Z00.00    Acrochordon     L91.8    Post-menopausal     Z78.0    Relevant Orders    XR DEXA bone density axial skeleton w VFA         She will follow-up with Dr. Doan for colonoscopy this year.  She should continue to follow with Dr. Bean.  Low-fat diet was discussed.  I encouraged regular exercise such as walking.  She may use hydrocortisone for intermittent itching.  She may use Tums or Rolaids for intermittent indigestion.  No further evaluation needed for her knee discomfort it is not affecting her activities.   She does take glucosamine daily.

## 2024-08-01 ENCOUNTER — TELEPHONE (OUTPATIENT)
Dept: PRIMARY CARE | Facility: CLINIC | Age: 79
End: 2024-08-01

## 2024-08-01 ENCOUNTER — APPOINTMENT (OUTPATIENT)
Dept: PRIMARY CARE | Facility: CLINIC | Age: 79
End: 2024-08-01
Payer: MEDICARE

## 2024-08-01 VITALS
HEIGHT: 61 IN | DIASTOLIC BLOOD PRESSURE: 64 MMHG | OXYGEN SATURATION: 94 % | WEIGHT: 129.6 LBS | SYSTOLIC BLOOD PRESSURE: 132 MMHG | HEART RATE: 74 BPM | BODY MASS INDEX: 24.47 KG/M2

## 2024-08-01 DIAGNOSIS — Z78.0 POST-MENOPAUSAL: ICD-10-CM

## 2024-08-01 DIAGNOSIS — Z00.00 ROUTINE GENERAL MEDICAL EXAMINATION AT HEALTH CARE FACILITY: Primary | ICD-10-CM

## 2024-08-01 DIAGNOSIS — L91.8 ACROCHORDON: ICD-10-CM

## 2024-08-01 DIAGNOSIS — I10 ESSENTIAL (PRIMARY) HYPERTENSION: ICD-10-CM

## 2024-08-01 PROCEDURE — 1125F AMNT PAIN NOTED PAIN PRSNT: CPT | Performed by: FAMILY MEDICINE

## 2024-08-01 PROCEDURE — 1160F RVW MEDS BY RX/DR IN RCRD: CPT | Performed by: FAMILY MEDICINE

## 2024-08-01 PROCEDURE — 1036F TOBACCO NON-USER: CPT | Performed by: FAMILY MEDICINE

## 2024-08-01 PROCEDURE — 1159F MED LIST DOCD IN RCRD: CPT | Performed by: FAMILY MEDICINE

## 2024-08-01 PROCEDURE — 1170F FXNL STATUS ASSESSED: CPT | Performed by: FAMILY MEDICINE

## 2024-08-01 PROCEDURE — G0439 PPPS, SUBSEQ VISIT: HCPCS | Performed by: FAMILY MEDICINE

## 2024-08-01 PROCEDURE — 3075F SYST BP GE 130 - 139MM HG: CPT | Performed by: FAMILY MEDICINE

## 2024-08-01 PROCEDURE — 3078F DIAST BP <80 MM HG: CPT | Performed by: FAMILY MEDICINE

## 2024-08-01 ASSESSMENT — PATIENT HEALTH QUESTIONNAIRE - PHQ9
2. FEELING DOWN, DEPRESSED OR HOPELESS: NOT AT ALL
1. LITTLE INTEREST OR PLEASURE IN DOING THINGS: NOT AT ALL
SUM OF ALL RESPONSES TO PHQ9 QUESTIONS 1 AND 2: 0

## 2024-08-01 ASSESSMENT — ACTIVITIES OF DAILY LIVING (ADL)
MANAGING_FINANCES: INDEPENDENT
DRESSING: INDEPENDENT
GROCERY_SHOPPING: INDEPENDENT
TAKING_MEDICATION: INDEPENDENT
BATHING: INDEPENDENT
DOING_HOUSEWORK: INDEPENDENT

## 2024-08-01 ASSESSMENT — PAIN SCALES - GENERAL: PAINLEVEL: 2

## 2024-08-01 NOTE — TELEPHONE ENCOUNTER
Patient called  [phone] she was seen today, she scheduled a bone density for 8-5-24, she needs to know if if she is able to take AREDS before the test , told her Dr Jaimes is out this pm

## 2024-08-01 NOTE — PATIENT INSTRUCTIONS
It was nice to see you today!    I will continue to be your doctor until the end of May 2025.  At that time you will be assigned to a new physician.   Dr. Liu is our new physician whom I would recommend.  You should follow-up with Dr. Doan for colonoscopy this year.    You may schedule your bone density at your convenience.

## 2024-08-05 ENCOUNTER — HOSPITAL ENCOUNTER (OUTPATIENT)
Dept: RADIOLOGY | Facility: HOSPITAL | Age: 79
Discharge: HOME | End: 2024-08-05
Payer: MEDICARE

## 2024-08-05 DIAGNOSIS — Z78.0 POST-MENOPAUSAL: ICD-10-CM

## 2024-08-05 PROCEDURE — 77085 DXA BONE DENSITY AXL VRT FX: CPT

## 2024-08-05 PROCEDURE — 77085 DXA BONE DENSITY AXL VRT FX: CPT | Performed by: RADIOLOGY

## 2024-08-05 ASSESSMENT — LIFESTYLE VARIABLES
CURRENT_SMOKER: N
3_OR_MORE_DRINKS_PER_DAY: N

## 2024-11-11 ENCOUNTER — HOSPITAL ENCOUNTER (EMERGENCY)
Facility: HOSPITAL | Age: 79
Discharge: HOME | End: 2024-11-11
Attending: STUDENT IN AN ORGANIZED HEALTH CARE EDUCATION/TRAINING PROGRAM
Payer: MEDICARE

## 2024-11-11 ENCOUNTER — PHARMACY VISIT (OUTPATIENT)
Dept: PHARMACY | Facility: CLINIC | Age: 79
End: 2024-11-11
Payer: MEDICARE

## 2024-11-11 ENCOUNTER — APPOINTMENT (OUTPATIENT)
Dept: RADIOLOGY | Facility: HOSPITAL | Age: 79
End: 2024-11-11
Payer: MEDICARE

## 2024-11-11 ENCOUNTER — APPOINTMENT (OUTPATIENT)
Dept: CARDIOLOGY | Facility: HOSPITAL | Age: 79
End: 2024-11-11
Payer: MEDICARE

## 2024-11-11 VITALS
HEIGHT: 63 IN | DIASTOLIC BLOOD PRESSURE: 68 MMHG | OXYGEN SATURATION: 95 % | RESPIRATION RATE: 16 BRPM | TEMPERATURE: 97.7 F | BODY MASS INDEX: 23.04 KG/M2 | SYSTOLIC BLOOD PRESSURE: 144 MMHG | WEIGHT: 130 LBS | HEART RATE: 57 BPM

## 2024-11-11 DIAGNOSIS — R42 DIZZINESS: Primary | ICD-10-CM

## 2024-11-11 LAB
ANION GAP SERPL CALCULATED.3IONS-SCNC: 14 MMOL/L (ref 10–20)
BASOPHILS # BLD AUTO: 0.02 X10*3/UL (ref 0–0.1)
BASOPHILS NFR BLD AUTO: 0.3 %
BUN SERPL-MCNC: 15 MG/DL (ref 6–23)
CALCIUM SERPL-MCNC: 9.6 MG/DL (ref 8.6–10.3)
CARDIAC TROPONIN I PNL SERPL HS: 16 NG/L (ref 0–13)
CHLORIDE SERPL-SCNC: 105 MMOL/L (ref 98–107)
CO2 SERPL-SCNC: 24 MMOL/L (ref 21–32)
CREAT SERPL-MCNC: 0.62 MG/DL (ref 0.5–1.05)
EGFRCR SERPLBLD CKD-EPI 2021: >90 ML/MIN/1.73M*2
EOSINOPHIL # BLD AUTO: 0.01 X10*3/UL (ref 0–0.4)
EOSINOPHIL NFR BLD AUTO: 0.1 %
ERYTHROCYTE [DISTWIDTH] IN BLOOD BY AUTOMATED COUNT: 13.8 % (ref 11.5–14.5)
GLUCOSE SERPL-MCNC: 125 MG/DL (ref 74–99)
HCT VFR BLD AUTO: 40.8 % (ref 36–46)
HGB BLD-MCNC: 13.6 G/DL (ref 12–16)
IMM GRANULOCYTES # BLD AUTO: 0.02 X10*3/UL (ref 0–0.5)
IMM GRANULOCYTES NFR BLD AUTO: 0.3 % (ref 0–0.9)
LYMPHOCYTES # BLD AUTO: 1.05 X10*3/UL (ref 0.8–3)
LYMPHOCYTES NFR BLD AUTO: 13.4 %
MCH RBC QN AUTO: 30.2 PG (ref 26–34)
MCHC RBC AUTO-ENTMCNC: 33.3 G/DL (ref 32–36)
MCV RBC AUTO: 91 FL (ref 80–100)
MONOCYTES # BLD AUTO: 0.27 X10*3/UL (ref 0.05–0.8)
MONOCYTES NFR BLD AUTO: 3.4 %
NEUTROPHILS # BLD AUTO: 6.46 X10*3/UL (ref 1.6–5.5)
NEUTROPHILS NFR BLD AUTO: 82.5 %
NRBC BLD-RTO: 0 /100 WBCS (ref 0–0)
PLATELET # BLD AUTO: 290 X10*3/UL (ref 150–450)
POTASSIUM SERPL-SCNC: 3.6 MMOL/L (ref 3.5–5.3)
RBC # BLD AUTO: 4.51 X10*6/UL (ref 4–5.2)
SODIUM SERPL-SCNC: 139 MMOL/L (ref 136–145)
WBC # BLD AUTO: 7.8 X10*3/UL (ref 4.4–11.3)

## 2024-11-11 PROCEDURE — RXMED WILLOW AMBULATORY MEDICATION CHARGE

## 2024-11-11 PROCEDURE — 93005 ELECTROCARDIOGRAM TRACING: CPT

## 2024-11-11 PROCEDURE — 2500000002 HC RX 250 W HCPCS SELF ADMINISTERED DRUGS (ALT 637 FOR MEDICARE OP, ALT 636 FOR OP/ED): Performed by: STUDENT IN AN ORGANIZED HEALTH CARE EDUCATION/TRAINING PROGRAM

## 2024-11-11 PROCEDURE — 71045 X-RAY EXAM CHEST 1 VIEW: CPT

## 2024-11-11 PROCEDURE — 84484 ASSAY OF TROPONIN QUANT: CPT | Performed by: STUDENT IN AN ORGANIZED HEALTH CARE EDUCATION/TRAINING PROGRAM

## 2024-11-11 PROCEDURE — 2500000004 HC RX 250 GENERAL PHARMACY W/ HCPCS (ALT 636 FOR OP/ED): Performed by: STUDENT IN AN ORGANIZED HEALTH CARE EDUCATION/TRAINING PROGRAM

## 2024-11-11 PROCEDURE — 70450 CT HEAD/BRAIN W/O DYE: CPT | Performed by: RADIOLOGY

## 2024-11-11 PROCEDURE — 85025 COMPLETE CBC W/AUTO DIFF WBC: CPT | Performed by: STUDENT IN AN ORGANIZED HEALTH CARE EDUCATION/TRAINING PROGRAM

## 2024-11-11 PROCEDURE — 82374 ASSAY BLOOD CARBON DIOXIDE: CPT | Performed by: STUDENT IN AN ORGANIZED HEALTH CARE EDUCATION/TRAINING PROGRAM

## 2024-11-11 PROCEDURE — 70450 CT HEAD/BRAIN W/O DYE: CPT

## 2024-11-11 PROCEDURE — 99285 EMERGENCY DEPT VISIT HI MDM: CPT | Mod: 25

## 2024-11-11 PROCEDURE — 96374 THER/PROPH/DIAG INJ IV PUSH: CPT

## 2024-11-11 PROCEDURE — 36415 COLL VENOUS BLD VENIPUNCTURE: CPT | Performed by: STUDENT IN AN ORGANIZED HEALTH CARE EDUCATION/TRAINING PROGRAM

## 2024-11-11 PROCEDURE — 71045 X-RAY EXAM CHEST 1 VIEW: CPT | Performed by: RADIOLOGY

## 2024-11-11 RX ORDER — ONDANSETRON HYDROCHLORIDE 2 MG/ML
4 INJECTION, SOLUTION INTRAVENOUS ONCE
Status: COMPLETED | OUTPATIENT
Start: 2024-11-11 | End: 2024-11-11

## 2024-11-11 RX ORDER — MECLIZINE HYDROCHLORIDE 25 MG/1
25 TABLET ORAL 3 TIMES DAILY PRN
Qty: 10 TABLET | Refills: 0 | Status: SHIPPED | OUTPATIENT
Start: 2024-11-11

## 2024-11-11 RX ORDER — MECLIZINE HYDROCHLORIDE 25 MG/1
25 TABLET ORAL ONCE
Status: COMPLETED | OUTPATIENT
Start: 2024-11-11 | End: 2024-11-11

## 2024-11-11 ASSESSMENT — COLUMBIA-SUICIDE SEVERITY RATING SCALE - C-SSRS
2. HAVE YOU ACTUALLY HAD ANY THOUGHTS OF KILLING YOURSELF?: NO
1. IN THE PAST MONTH, HAVE YOU WISHED YOU WERE DEAD OR WISHED YOU COULD GO TO SLEEP AND NOT WAKE UP?: NO
6. HAVE YOU EVER DONE ANYTHING, STARTED TO DO ANYTHING, OR PREPARED TO DO ANYTHING TO END YOUR LIFE?: NO

## 2024-11-11 ASSESSMENT — PAIN - FUNCTIONAL ASSESSMENT: PAIN_FUNCTIONAL_ASSESSMENT: 0-10

## 2024-11-11 ASSESSMENT — PAIN SCALES - GENERAL: PAINLEVEL_OUTOF10: 0 - NO PAIN

## 2024-11-11 NOTE — ED PROVIDER NOTES
HPI   Chief Complaint   Patient presents with    Dizziness     This morning at 0330 I started to feel dizzy and then I started having nausea and vomiting when I move my head up to look at the ceiling I get more dizzy       Patient presents with dizziness, nausea, and vomiting.  When she moves her head, she becomes more dizzy.  Looking up also causes her to become more dizzy.  She does have history of vertigo.  She reports that she had been given some exercises to do for her vertigo although has not had to be with them and several years.  She has had several episodes of vomiting this morning.              Patient History   Past Medical History:   Diagnosis Date    Acute bronchitis 04/08/2019    Acute conjunctivitis, right eye 10/12/2019    Acute URI 11/08/2018    Bitten by cat, initial encounter 08/27/2018    Breast cancer (Multi)     left breast cancer mastectomy with implants    Cat bite 08/27/2018    Cough 04/07/2019    Hypertension     Puncture wound of left hand 08/27/2018    Rapid atrial fibrillation (Multi) 09/11/2023    Right elbow pain 10/03/2019    Viral upper respiratory infection 11/12/2020     Past Surgical History:   Procedure Laterality Date    HYSTERECTOMY      MASTECTOMY Bilateral     left breast cancer  mastectomy with implants     Family History   Problem Relation Name Age of Onset    Breast cancer Mother      Heart disease Mother      Hypertension Mother      Heart disease Father      Hypertension Father      Heart disease Brother      Hypertension Brother      Breast cancer Other FAMILY 51     Social History     Tobacco Use    Smoking status: Never    Smokeless tobacco: Never   Vaping Use    Vaping status: Never Used   Substance Use Topics    Alcohol use: Not Currently    Drug use: Never       Physical Exam   ED Triage Vitals [11/11/24 1003]   Temperature Heart Rate Respirations BP   36.5 °C (97.7 °F) 66 18 155/72      Pulse Ox Temp Source Heart Rate Source Patient Position   100 % Temporal  Monitor Sitting      BP Location FiO2 (%)     Right arm --       Physical Exam  HENT:      Head: Normocephalic.   Eyes:      General: No scleral icterus.     Extraocular Movements: Extraocular movements intact.      Pupils: Pupils are equal, round, and reactive to light.   Cardiovascular:      Rate and Rhythm: Normal rate.   Pulmonary:      Effort: Pulmonary effort is normal.   Neurological:      Mental Status: She is alert.      Comments: Patient awake and alert, answers questions appropriately.  Strength and sensation equal bilaterally.           ED Course & MDM   ED Course as of 11/11/24 1237   Mon Nov 11, 2024   1011 EKG interpreted by me: Normal sinus rhythm, rate 65.  Normal axis.  No ST or T wave abnormalities. [ML]      ED Course User Index  [ML] Samson Pozo MD         Diagnoses as of 11/11/24 1237   Dizziness                 No data recorded     Marilee Coma Scale Score: 15 (11/11/24 0958 : Jose Aj, EMT)       NIH Stroke Scale: 0 (11/11/24 1027 : Samson Pozo MD)                   Medical Decision Making  Following dose of meclizine, patient reports that her symptoms have significantly improved.  She was able to ambulate in the emergency department.  In setting of NIH stroke scale of 0 with negative test of skew and no truncal instability/ataxia, my suspicion for CVA as etiology of symptoms is very low.  Patient given prescription for meclizine to use as needed and was advised to follow-up with primary care physician.  Return precautions given for any worsening symptoms.  CT of the head and laboratory studies unremarkable.  Chest x-ray was interpreted as linear interface in the lateral left lower chest, my suspicion for pneumothorax is extremely low.  Parts of this chart were completed with dictation software, please excuse any errors in transcription.        Procedure  Procedures     Samson Pozo MD  11/11/24 1232

## 2024-11-11 NOTE — DISCHARGE INSTRUCTIONS
You have been given a prescription for meclizine, a medication which you may use as needed for dizziness.  You should follow-up with your primary care physician.  Return to the emergency department with any worsening symptoms.    It is important to remember that your care does not end here and you must continue to monitor your condition closely. Please return to the emergency department for any worsening or concerning signs or symptoms as directed by our conversations and the discharge instructions. If you do not have a doctor please contact the referral number on your discharge instructions. Please contact any physician specialists provided in your discharge notes as it is very important to follow up with them regarding your condition. If you are unable to reach the physicians provided, please come back to the Emergency Department at any time.    Return to emergency room without delay for ANY new or worsening pains or for any other symptoms or concerns.  Return with worsening pains, nausea, vomiting, trouble breathing, palpitations, shortness of breath, inability to pass stool or urine, loss of control of stool or urine, any numbness or tingling (that is not normal for you), uncontrolled fevers, the passing of blood or other material in stool or urine, rashes, pains or for any other symptoms or concerns you may have.  You are always welcome to return to the ER at any time for any reason or for any other concerns you may have.

## 2024-11-12 ENCOUNTER — TELEPHONE (OUTPATIENT)
Dept: PRIMARY CARE | Facility: CLINIC | Age: 79
End: 2024-11-12
Payer: MEDICARE

## 2024-11-12 NOTE — TELEPHONE ENCOUNTER
Patient called and stated she was seen at [facility] on 11-11-24, she was also seen by Dr Edgar Fierro 01-25-21 for the same diagnosis ear crystals/dizziness. Patient stated she was given some exercises from her appointment on 01-25-21 that really worked and she would like a copy.    Patient can be reached at [phone]

## 2024-11-12 NOTE — PROGRESS NOTES
"Subjective   Patient ID: Gloria Echavarria is a 79 y.o. female who presents for ER Follow-up (Seen 11/11 for ear crystals. Denies dizziness but states her head and stomach is \"off\" ).    PHU Castro presents for emergency room follow-up.  She was seen on November 11 after an acute episode of dizziness followed by vomiting.  Symptoms started when she got up out of bed and looked up at the ceiling.  Emergency room reports are reviewed.  Her exam in the emergency room was benign including her EKG and troponin levels.  It was felt that she was having peripheral vertigo.  She states that she had this before about 4 years ago.  She downloaded some exercises to do from the Internet and started doing canalith repositioning yesterday.  She has not vomited since she has been in the emergency room.  She denies further dizziness.  She just feels like she is a little off.  She intermittently gets a stomachache but no real nausea.  She has not taken anything for that.  Her vision just seems a little foggy.  She had cataract surgery that she has not been happy with the outcome about a year ago.  She has an eye exam coming up.  She was given meclizine in the emergency room.  She has not needed to take that.  She sees Dr. Bean for her hypertension.    Review of Systems  She denies recent illness.  No fever or chills.  She denies changes in hearing or tinnitus.  She denies headache.  No chest tightness or palpitations.  No vomiting.  No change in bowel habits.  No numbness, tingling or weakness.      Objective   /72 (BP Location: Left arm)   Pulse 80   Wt 59.9 kg (132 lb)   SpO2 96%   BMI 23.38 kg/m²     Physical Exam  She is alert and comfortable.  Ears TMs are clear.  Eyes PERRL, EOMI.  No nystagmus.  Nose is noncongested and throat is noninjected.  Uvula is midline.  Neck is supple without adenopathy.  Lungs are clear to auscultation.  Heart is regular in rhythm and rate.  Abdomen is soft without rebound or " guarding.  No organomegaly.  No masses.  Skin turgor is normal and mucous membranes are moist.  Neuro is intact and nonfocal.    Assessment/Plan   Assessment & Plan  Benign paroxysmal positional vertigo, unspecified laterality  She needs to continue to do the canalith repositioning once or twice a day for the next 2 weeks.  She will have continued improvement after just starting these yesterday.  I recommend that she avoid salt.  I also suggest that she eat a bland diet for the next couple days.  This would be limiting tomato based products, spices and caffeine.

## 2024-11-15 ENCOUNTER — OFFICE VISIT (OUTPATIENT)
Dept: PRIMARY CARE | Facility: CLINIC | Age: 79
End: 2024-11-15
Payer: MEDICARE

## 2024-11-15 VITALS
HEART RATE: 80 BPM | BODY MASS INDEX: 23.38 KG/M2 | WEIGHT: 132 LBS | DIASTOLIC BLOOD PRESSURE: 72 MMHG | OXYGEN SATURATION: 96 % | SYSTOLIC BLOOD PRESSURE: 142 MMHG

## 2024-11-15 DIAGNOSIS — H81.10 BENIGN PAROXYSMAL POSITIONAL VERTIGO, UNSPECIFIED LATERALITY: Primary | ICD-10-CM

## 2024-11-15 PROCEDURE — 3078F DIAST BP <80 MM HG: CPT | Performed by: FAMILY MEDICINE

## 2024-11-15 PROCEDURE — 1160F RVW MEDS BY RX/DR IN RCRD: CPT | Performed by: FAMILY MEDICINE

## 2024-11-15 PROCEDURE — 99214 OFFICE O/P EST MOD 30 MIN: CPT | Performed by: FAMILY MEDICINE

## 2024-11-15 PROCEDURE — 1123F ACP DISCUSS/DSCN MKR DOCD: CPT | Performed by: FAMILY MEDICINE

## 2024-11-15 PROCEDURE — 1036F TOBACCO NON-USER: CPT | Performed by: FAMILY MEDICINE

## 2024-11-15 PROCEDURE — 3077F SYST BP >= 140 MM HG: CPT | Performed by: FAMILY MEDICINE

## 2024-11-15 PROCEDURE — 1158F ADVNC CARE PLAN TLK DOCD: CPT | Performed by: FAMILY MEDICINE

## 2024-11-15 PROCEDURE — 1159F MED LIST DOCD IN RCRD: CPT | Performed by: FAMILY MEDICINE

## 2024-11-15 PROCEDURE — 1126F AMNT PAIN NOTED NONE PRSNT: CPT | Performed by: FAMILY MEDICINE

## 2024-11-15 RX ORDER — BUTYROSPERMUM PARKII(SHEA BUTTER), SIMMONDSIA CHINENSIS (JOJOBA) SEED OIL, ALOE BARBADENSIS LEAF EXTRACT .01; 1; 3.5 G/100G; G/100G; G/100G
250 LIQUID TOPICAL 2 TIMES DAILY
COMMUNITY

## 2024-11-15 ASSESSMENT — PATIENT HEALTH QUESTIONNAIRE - PHQ9
2. FEELING DOWN, DEPRESSED OR HOPELESS: NOT AT ALL
SUM OF ALL RESPONSES TO PHQ9 QUESTIONS 1 AND 2: 0
1. LITTLE INTEREST OR PLEASURE IN DOING THINGS: NOT AT ALL

## 2024-11-15 ASSESSMENT — PAIN SCALES - GENERAL: PAINLEVEL_OUTOF10: 0-NO PAIN

## 2024-11-15 NOTE — PATIENT INSTRUCTIONS
I recommend Tums as needed for an upper set stomach.  I suggest that you avoid tomato-based products and spicy foods for the next week.

## 2024-11-15 NOTE — ASSESSMENT & PLAN NOTE
She needs to continue to do the canalith repositioning once or twice a day for the next 2 weeks.  She will have continued improvement after just starting these yesterday.  I recommend that she avoid salt.  I also suggest that she eat a bland diet for the next couple days.  This would be limiting tomato based products, spices and caffeine.

## 2024-11-25 LAB
ATRIAL RATE: 65 BPM
P AXIS: 40 DEGREES
P OFFSET: 188 MS
P ONSET: 150 MS
PR INTERVAL: 144 MS
Q ONSET: 222 MS
QRS COUNT: 10 BEATS
QRS DURATION: 90 MS
QT INTERVAL: 416 MS
QTC CALCULATION(BAZETT): 432 MS
QTC FREDERICIA: 427 MS
R AXIS: 79 DEGREES
T AXIS: 83 DEGREES
T OFFSET: 430 MS
VENTRICULAR RATE: 65 BPM

## 2025-01-08 ENCOUNTER — TELEPHONE (OUTPATIENT)
Dept: PRIMARY CARE | Facility: CLINIC | Age: 80
End: 2025-01-08
Payer: MEDICARE

## 2025-01-30 NOTE — PROGRESS NOTES
"Subjective   Patient ID: Golria Echavarria is a 79 y.o. female who presents for Mass.    HPI   Gloria is seen for a lump on her back that she noted a couple weeks ago.  She sees Dr. Weinstein annually for skin survey and was last there in the summertime.  She is having no pain in the area.  No drainage.  She also states that she has had a long history of back pain.  She has been seeing a chiropractor who recently retired.  She has had some flares of her right sided low back pain.  It does not radiate down her legs.  She started to do some exercises that she had from years ago when she had some back surgery.  She feels like it is keeping the discomfort from getting any worse.  She states she plans to keep doing the exercises for another week and if she still is having discomfort she is going to look for a new chiropractor.    Review of Systems  She denies fever or chills.  No change in bowel or bladder habits.    Objective   /72   Pulse 61   Ht 1.6 m (5' 3\")   Wt 60.8 kg (134 lb)   SpO2 98%   BMI 23.74 kg/m²     Physical Exam  She is alert and comfortable.  She maneuvers on and off the table without difficulty.  She has a small sebaceous cyst in her lower thoracic back which is soft and freely mobile.  She has multiple keratosis on her back.    Assessment/Plan   Assessment & Plan  Sebaceous cyst  No further evaluation is necessary.  For any growth or drainage she should see her dermatologist.       Dorsalgia  I offered physical therapy which she was not interested in at this time.              "

## 2025-01-31 ENCOUNTER — OFFICE VISIT (OUTPATIENT)
Dept: PRIMARY CARE | Facility: CLINIC | Age: 80
End: 2025-01-31
Payer: MEDICARE

## 2025-01-31 VITALS
HEIGHT: 63 IN | WEIGHT: 134 LBS | OXYGEN SATURATION: 98 % | HEART RATE: 61 BPM | SYSTOLIC BLOOD PRESSURE: 136 MMHG | BODY MASS INDEX: 23.74 KG/M2 | DIASTOLIC BLOOD PRESSURE: 72 MMHG

## 2025-01-31 DIAGNOSIS — M54.9 DORSALGIA: ICD-10-CM

## 2025-01-31 DIAGNOSIS — L72.3 SEBACEOUS CYST: Primary | ICD-10-CM

## 2025-01-31 PROBLEM — M20.42 HAMMERTOE OF SECOND TOE OF LEFT FOOT: Status: RESOLVED | Noted: 2023-04-13 | Resolved: 2025-01-31

## 2025-01-31 PROBLEM — L98.9 INFLAMMATORY DERMATOSIS: Status: RESOLVED | Noted: 2025-01-31 | Resolved: 2025-01-31

## 2025-01-31 PROBLEM — R53.82 CHRONIC FATIGUE: Status: RESOLVED | Noted: 2023-09-11 | Resolved: 2025-01-31

## 2025-01-31 PROBLEM — L23.9 ALLERGIC CONTACT DERMATITIS: Status: RESOLVED | Noted: 2023-08-17 | Resolved: 2025-01-31

## 2025-01-31 PROBLEM — K21.00 GASTROESOPHAGEAL REFLUX DISEASE WITH ESOPHAGITIS: Status: ACTIVE | Noted: 2025-01-31

## 2025-01-31 PROBLEM — M79.661 PAIN IN RIGHT LOWER LEG: Status: RESOLVED | Noted: 2021-04-24 | Resolved: 2025-01-31

## 2025-01-31 PROBLEM — J90 BILATERAL PLEURAL EFFUSION: Status: RESOLVED | Noted: 2023-09-11 | Resolved: 2025-01-31

## 2025-01-31 PROBLEM — K29.50 CHRONIC GASTRITIS: Status: RESOLVED | Noted: 2025-01-31 | Resolved: 2025-01-31

## 2025-01-31 PROBLEM — L03.90 CELLULITIS: Status: RESOLVED | Noted: 2023-08-17 | Resolved: 2025-01-31

## 2025-01-31 PROBLEM — H57.9 LEFT EYE COMPLAINT: Status: RESOLVED | Noted: 2020-08-08 | Resolved: 2025-01-31

## 2025-01-31 PROBLEM — K29.50 CHRONIC GASTRITIS: Status: ACTIVE | Noted: 2025-01-31

## 2025-01-31 PROBLEM — D12.5 BENIGN NEOPLASM OF SIGMOID COLON: Status: RESOLVED | Noted: 2023-09-11 | Resolved: 2025-01-31

## 2025-01-31 PROBLEM — R22.41 LOCALIZED SWELLING OF RIGHT LOWER LEG: Status: RESOLVED | Noted: 2021-04-24 | Resolved: 2025-01-31

## 2025-01-31 PROBLEM — R40.1 CLOUDED CONSCIOUSNESS: Status: RESOLVED | Noted: 2023-09-11 | Resolved: 2025-01-31

## 2025-01-31 PROBLEM — M77.12 LATERAL EPICONDYLITIS OF LEFT ELBOW: Status: RESOLVED | Noted: 2022-10-25 | Resolved: 2025-01-31

## 2025-01-31 PROBLEM — R93.3 IMAGING OF GASTROINTESTINAL TRACT ABNORMAL: Status: RESOLVED | Noted: 2023-09-11 | Resolved: 2025-01-31

## 2025-01-31 PROBLEM — R42 DIZZY: Status: RESOLVED | Noted: 2022-08-11 | Resolved: 2025-01-31

## 2025-01-31 PROBLEM — K44.9 DIAPHRAGMATIC HERNIA: Status: RESOLVED | Noted: 2023-09-11 | Resolved: 2025-01-31

## 2025-01-31 PROBLEM — J98.11 ATELECTASIS: Status: RESOLVED | Noted: 2023-09-11 | Resolved: 2025-01-31

## 2025-01-31 PROBLEM — K29.71 HEMORRHAGIC GASTRITIS: Status: RESOLVED | Noted: 2023-09-11 | Resolved: 2025-01-31

## 2025-01-31 PROBLEM — M67.441 GANGLION CYST OF TENDON SHEATH OF RIGHT HAND: Status: RESOLVED | Noted: 2021-10-28 | Resolved: 2025-01-31

## 2025-01-31 PROCEDURE — 1036F TOBACCO NON-USER: CPT | Performed by: FAMILY MEDICINE

## 2025-01-31 PROCEDURE — 1160F RVW MEDS BY RX/DR IN RCRD: CPT | Performed by: FAMILY MEDICINE

## 2025-01-31 PROCEDURE — 1126F AMNT PAIN NOTED NONE PRSNT: CPT | Performed by: FAMILY MEDICINE

## 2025-01-31 PROCEDURE — 1123F ACP DISCUSS/DSCN MKR DOCD: CPT | Performed by: FAMILY MEDICINE

## 2025-01-31 PROCEDURE — 99212 OFFICE O/P EST SF 10 MIN: CPT | Performed by: FAMILY MEDICINE

## 2025-01-31 PROCEDURE — 1158F ADVNC CARE PLAN TLK DOCD: CPT | Performed by: FAMILY MEDICINE

## 2025-01-31 PROCEDURE — 3075F SYST BP GE 130 - 139MM HG: CPT | Performed by: FAMILY MEDICINE

## 2025-01-31 PROCEDURE — 1159F MED LIST DOCD IN RCRD: CPT | Performed by: FAMILY MEDICINE

## 2025-01-31 PROCEDURE — 3078F DIAST BP <80 MM HG: CPT | Performed by: FAMILY MEDICINE

## 2025-01-31 ASSESSMENT — COLUMBIA-SUICIDE SEVERITY RATING SCALE - C-SSRS: 1. IN THE PAST MONTH, HAVE YOU WISHED YOU WERE DEAD OR WISHED YOU COULD GO TO SLEEP AND NOT WAKE UP?: NO

## 2025-01-31 ASSESSMENT — PAIN SCALES - GENERAL: PAINLEVEL_OUTOF10: 0-NO PAIN

## 2025-06-08 DIAGNOSIS — I10 ESSENTIAL (PRIMARY) HYPERTENSION: ICD-10-CM

## 2025-06-09 RX ORDER — AMLODIPINE BESYLATE 5 MG/1
5 TABLET ORAL DAILY
Qty: 90 TABLET | Refills: 3 | Status: SHIPPED | OUTPATIENT
Start: 2025-06-09

## 2025-06-20 ENCOUNTER — TELEPHONE (OUTPATIENT)
Dept: PRIMARY CARE | Facility: CLINIC | Age: 80
End: 2025-06-20
Payer: MEDICARE

## 2025-06-20 DIAGNOSIS — I10 ESSENTIAL (PRIMARY) HYPERTENSION: ICD-10-CM

## 2025-06-20 DIAGNOSIS — E55.9 VITAMIN D DEFICIENCY: ICD-10-CM

## 2025-06-20 DIAGNOSIS — E78.5 HYPERLIPIDEMIA, UNSPECIFIED HYPERLIPIDEMIA TYPE: ICD-10-CM

## 2025-06-30 ENCOUNTER — APPOINTMENT (OUTPATIENT)
Dept: PRIMARY CARE | Facility: CLINIC | Age: 80
End: 2025-06-30
Payer: MEDICARE

## 2025-07-03 ENCOUNTER — OFFICE VISIT (OUTPATIENT)
Dept: PRIMARY CARE | Facility: CLINIC | Age: 80
End: 2025-07-03
Payer: MEDICARE

## 2025-07-03 VITALS
HEIGHT: 63 IN | BODY MASS INDEX: 23.57 KG/M2 | DIASTOLIC BLOOD PRESSURE: 80 MMHG | WEIGHT: 133 LBS | SYSTOLIC BLOOD PRESSURE: 142 MMHG | OXYGEN SATURATION: 98 % | HEART RATE: 80 BPM

## 2025-07-03 DIAGNOSIS — L81.9 PIGMENTED SKIN LESION OF UNCERTAIN BEHAVIOR OF TORSO: ICD-10-CM

## 2025-07-03 DIAGNOSIS — L70.0 BLACK HEAD: Primary | ICD-10-CM

## 2025-07-03 DIAGNOSIS — L72.3 SEBACEOUS CYST: ICD-10-CM

## 2025-07-03 PROCEDURE — 99213 OFFICE O/P EST LOW 20 MIN: CPT | Performed by: STUDENT IN AN ORGANIZED HEALTH CARE EDUCATION/TRAINING PROGRAM

## 2025-07-03 PROCEDURE — 3079F DIAST BP 80-89 MM HG: CPT | Performed by: STUDENT IN AN ORGANIZED HEALTH CARE EDUCATION/TRAINING PROGRAM

## 2025-07-03 PROCEDURE — 1126F AMNT PAIN NOTED NONE PRSNT: CPT | Performed by: STUDENT IN AN ORGANIZED HEALTH CARE EDUCATION/TRAINING PROGRAM

## 2025-07-03 PROCEDURE — 3077F SYST BP >= 140 MM HG: CPT | Performed by: STUDENT IN AN ORGANIZED HEALTH CARE EDUCATION/TRAINING PROGRAM

## 2025-07-03 ASSESSMENT — PAIN SCALES - GENERAL: PAINLEVEL_OUTOF10: 0-NO PAIN

## 2025-07-03 ASSESSMENT — COLUMBIA-SUICIDE SEVERITY RATING SCALE - C-SSRS: 1. IN THE PAST MONTH, HAVE YOU WISHED YOU WERE DEAD OR WISHED YOU COULD GO TO SLEEP AND NOT WAKE UP?: NO

## 2025-07-03 NOTE — PROGRESS NOTES
"Subjective   Patient ID: Gloria Echavarria is a 79 y.o. female who presents for Cyst (Patient states she may have a cyst on her back. /I looked at the lump it looks like a possible black head? She states there is no sensitivity when I touch it but at times there is pain. ).    Has had cyst on back for over a year.  Previously was not bothersome.      Started hurting about a week ago.     She was examined by prior pcp and told it was a sebaceous cyst.  She was examined recently by an outside nurse and told there was a blackhead in the area.     She also has a skin tag on right side of trunk that has been bothering her for quite some time.  It frequently catches on clothing and becomes painful.          Review of Systems   Skin:  Positive for color change and wound.   All other systems reviewed and are negative.      Objective     Visit Vitals  /80   Pulse 80   Ht 1.6 m (5' 3\")   Wt 60.3 kg (133 lb)   SpO2 98%   BMI 23.56 kg/m²   OB Status Hysterectomy   Smoking Status Never   BSA 1.64 m²         Physical Exam  Constitutional:       Appearance: Normal appearance. She is normal weight.   Cardiovascular:      Rate and Rhythm: Normal rate and regular rhythm.      Heart sounds: Normal heart sounds.   Pulmonary:      Effort: Pulmonary effort is normal.      Breath sounds: Normal breath sounds.   Skin:     Findings: Lesion present.      Comments: Midline lower thoracic back - sebaceous cyst.  Superficial to cyst is a large pore filled with keratin debris.  There are numerous seborrheic keratoses.    Right trunk, midaxillary line - fleshy nodule with hyperpigmentation and papules along its surface giving a ciliated appearance.  Nodule sits on a hyperpigmented base.    Neurological:      Mental Status: She is alert.         Assessment & Plan  Black head       patient with large black head in mid back.  Large amount of caseous debris was removed in office.  Patient advised to keep area clean and dry, and that it may " recur in the future, in which case it can be expressed again.   Sebaceous cyst       patient with approximately 2 cm sebaceous cyst underling blackhead and physically distinct from it.  May require I&D or removal in toto.  Will evaluate further at follow up appointment.   Pigmented skin lesion of uncertain behavior of torso       patient with skin tag / mass on braline on right side of trunk.  Is repeated scratched by clothing and becomes inflamed and painful.  Has been enlarging over time.  Would plan to remove in the near future.  Generally benign appearing, however since it is enlarging and inflamed, would potentially send for pathology analysis.        Reviewed and approved by SAÚL CASTILLO on 7/4/25 at 10:29 AM.

## 2025-07-04 ASSESSMENT — ENCOUNTER SYMPTOMS
WOUND: 1
COLOR CHANGE: 1

## 2025-07-07 DIAGNOSIS — E78.5 HYPERLIPIDEMIA, UNSPECIFIED HYPERLIPIDEMIA TYPE: ICD-10-CM

## 2025-07-07 DIAGNOSIS — I10 ESSENTIAL (PRIMARY) HYPERTENSION: ICD-10-CM

## 2025-07-07 DIAGNOSIS — E55.9 VITAMIN D DEFICIENCY: ICD-10-CM

## 2025-08-01 LAB
25(OH)D3+25(OH)D2 SERPL-MCNC: 50 NG/ML (ref 30–100)
ALBUMIN SERPL-MCNC: 4.3 G/DL (ref 3.6–5.1)
ALP SERPL-CCNC: 83 U/L (ref 37–153)
ALT SERPL-CCNC: 32 U/L (ref 6–29)
ANION GAP SERPL CALCULATED.4IONS-SCNC: 11 MMOL/L (CALC) (ref 7–17)
AST SERPL-CCNC: 28 U/L (ref 10–35)
BASOPHILS # BLD AUTO: 38 CELLS/UL (ref 0–200)
BASOPHILS NFR BLD AUTO: 0.6 %
BILIRUB SERPL-MCNC: 0.6 MG/DL (ref 0.2–1.2)
BUN SERPL-MCNC: 16 MG/DL (ref 7–25)
CALCIUM SERPL-MCNC: 9.6 MG/DL (ref 8.6–10.4)
CHLORIDE SERPL-SCNC: 106 MMOL/L (ref 98–110)
CHOLEST SERPL-MCNC: 204 MG/DL
CHOLEST/HDLC SERPL: 2.4 (CALC)
CO2 SERPL-SCNC: 25 MMOL/L (ref 20–32)
CREAT SERPL-MCNC: 0.69 MG/DL (ref 0.6–1)
EGFRCR SERPLBLD CKD-EPI 2021: 88 ML/MIN/1.73M2
EOSINOPHIL # BLD AUTO: 102 CELLS/UL (ref 15–500)
EOSINOPHIL NFR BLD AUTO: 1.6 %
ERYTHROCYTE [DISTWIDTH] IN BLOOD BY AUTOMATED COUNT: 13.2 % (ref 11–15)
GLUCOSE SERPL-MCNC: 97 MG/DL (ref 65–99)
HCT VFR BLD AUTO: 43.2 % (ref 35–45)
HDLC SERPL-MCNC: 84 MG/DL
HGB BLD-MCNC: 14.1 G/DL (ref 11.7–15.5)
LDLC SERPL CALC-MCNC: 100 MG/DL (CALC)
LYMPHOCYTES # BLD AUTO: 2938 CELLS/UL (ref 850–3900)
LYMPHOCYTES NFR BLD AUTO: 45.9 %
MCH RBC QN AUTO: 30.9 PG (ref 27–33)
MCHC RBC AUTO-ENTMCNC: 32.6 G/DL (ref 32–36)
MCV RBC AUTO: 94.7 FL (ref 80–100)
MONOCYTES # BLD AUTO: 429 CELLS/UL (ref 200–950)
MONOCYTES NFR BLD AUTO: 6.7 %
NEUTROPHILS # BLD AUTO: 2893 CELLS/UL (ref 1500–7800)
NEUTROPHILS NFR BLD AUTO: 45.2 %
NONHDLC SERPL-MCNC: 120 MG/DL (CALC)
PLATELET # BLD AUTO: 300 THOUSAND/UL (ref 140–400)
PMV BLD REES-ECKER: 10.5 FL (ref 7.5–12.5)
POTASSIUM SERPL-SCNC: 4.2 MMOL/L (ref 3.5–5.3)
PROT SERPL-MCNC: 7 G/DL (ref 6.1–8.1)
RBC # BLD AUTO: 4.56 MILLION/UL (ref 3.8–5.1)
SODIUM SERPL-SCNC: 142 MMOL/L (ref 135–146)
TRIGL SERPL-MCNC: 102 MG/DL
WBC # BLD AUTO: 6.4 THOUSAND/UL (ref 3.8–10.8)

## 2025-08-07 ENCOUNTER — APPOINTMENT (OUTPATIENT)
Dept: PRIMARY CARE | Facility: CLINIC | Age: 80
End: 2025-08-07
Payer: MEDICARE

## 2025-08-07 ENCOUNTER — TELEPHONE (OUTPATIENT)
Dept: PRIMARY CARE | Facility: CLINIC | Age: 80
End: 2025-08-07

## 2025-08-07 VITALS
BODY MASS INDEX: 24.48 KG/M2 | HEIGHT: 62 IN | DIASTOLIC BLOOD PRESSURE: 62 MMHG | SYSTOLIC BLOOD PRESSURE: 140 MMHG | OXYGEN SATURATION: 97 % | WEIGHT: 133 LBS | HEART RATE: 79 BPM

## 2025-08-07 DIAGNOSIS — L98.9 SKIN LESION OF CHEST WALL: ICD-10-CM

## 2025-08-07 DIAGNOSIS — Z00.00 ROUTINE GENERAL MEDICAL EXAMINATION AT HEALTH CARE FACILITY: Primary | ICD-10-CM

## 2025-08-07 DIAGNOSIS — I10 ESSENTIAL (PRIMARY) HYPERTENSION: ICD-10-CM

## 2025-08-07 DIAGNOSIS — R68.89 COLD INTOLERANCE: ICD-10-CM

## 2025-08-07 DIAGNOSIS — N39.3 STRESS INCONTINENCE: Primary | ICD-10-CM

## 2025-08-07 DIAGNOSIS — K21.9 GASTROESOPHAGEAL REFLUX DISEASE, UNSPECIFIED WHETHER ESOPHAGITIS PRESENT: ICD-10-CM

## 2025-08-07 DIAGNOSIS — E55.9 VITAMIN D DEFICIENCY: ICD-10-CM

## 2025-08-07 DIAGNOSIS — Z87.19 H/O HIATAL HERNIA: ICD-10-CM

## 2025-08-07 DIAGNOSIS — N39.3 STRESS INCONTINENCE: ICD-10-CM

## 2025-08-07 DIAGNOSIS — E78.5 HYPERLIPIDEMIA, UNSPECIFIED HYPERLIPIDEMIA TYPE: ICD-10-CM

## 2025-08-07 LAB
POC APPEARANCE, URINE: CLEAR
POC BILIRUBIN, URINE: NEGATIVE
POC BLOOD, URINE: ABNORMAL
POC COLOR, URINE: YELLOW
POC GLUCOSE, URINE: NEGATIVE MG/DL
POC KETONES, URINE: NEGATIVE MG/DL
POC LEUKOCYTES, URINE: ABNORMAL
POC NITRITE,URINE: NEGATIVE
POC PH, URINE: 6 PH
POC PROTEIN, URINE: NEGATIVE MG/DL
POC SPECIFIC GRAVITY, URINE: 1.02
POC UROBILINOGEN, URINE: 0.2 EU/DL

## 2025-08-07 PROCEDURE — 1159F MED LIST DOCD IN RCRD: CPT | Performed by: STUDENT IN AN ORGANIZED HEALTH CARE EDUCATION/TRAINING PROGRAM

## 2025-08-07 PROCEDURE — G0439 PPPS, SUBSEQ VISIT: HCPCS | Performed by: STUDENT IN AN ORGANIZED HEALTH CARE EDUCATION/TRAINING PROGRAM

## 2025-08-07 PROCEDURE — 3078F DIAST BP <80 MM HG: CPT | Performed by: STUDENT IN AN ORGANIZED HEALTH CARE EDUCATION/TRAINING PROGRAM

## 2025-08-07 PROCEDURE — 1126F AMNT PAIN NOTED NONE PRSNT: CPT | Performed by: STUDENT IN AN ORGANIZED HEALTH CARE EDUCATION/TRAINING PROGRAM

## 2025-08-07 PROCEDURE — 99213 OFFICE O/P EST LOW 20 MIN: CPT | Performed by: STUDENT IN AN ORGANIZED HEALTH CARE EDUCATION/TRAINING PROGRAM

## 2025-08-07 PROCEDURE — 81003 URINALYSIS AUTO W/O SCOPE: CPT | Performed by: STUDENT IN AN ORGANIZED HEALTH CARE EDUCATION/TRAINING PROGRAM

## 2025-08-07 PROCEDURE — 3077F SYST BP >= 140 MM HG: CPT | Performed by: STUDENT IN AN ORGANIZED HEALTH CARE EDUCATION/TRAINING PROGRAM

## 2025-08-07 PROCEDURE — 1170F FXNL STATUS ASSESSED: CPT | Performed by: STUDENT IN AN ORGANIZED HEALTH CARE EDUCATION/TRAINING PROGRAM

## 2025-08-07 ASSESSMENT — ENCOUNTER SYMPTOMS
CHILLS: 0
POLYDIPSIA: 0
HEADACHES: 0
WOUND: 0
SINUS PRESSURE: 0
SINUS PAIN: 0
COUGH: 0
FREQUENCY: 0
PALPITATIONS: 0
NERVOUS/ANXIOUS: 0
LIGHT-HEADEDNESS: 0
FEVER: 0
FATIGUE: 0
DIARRHEA: 0
DYSPHORIC MOOD: 0
WHEEZING: 0
DYSURIA: 0
SLEEP DISTURBANCE: 0
CONSTIPATION: 0
RHINORRHEA: 0
VOMITING: 0
NAUSEA: 0
DIZZINESS: 0
SHORTNESS OF BREATH: 0
MYALGIAS: 0
ABDOMINAL PAIN: 1
ARTHRALGIAS: 0

## 2025-08-07 ASSESSMENT — PAIN SCALES - GENERAL: PAINLEVEL_OUTOF10: 0-NO PAIN

## 2025-08-07 ASSESSMENT — ACTIVITIES OF DAILY LIVING (ADL)
MANAGING_FINANCES: INDEPENDENT
BATHING: INDEPENDENT
GROCERY_SHOPPING: INDEPENDENT
TAKING_MEDICATION: INDEPENDENT
DOING_HOUSEWORK: INDEPENDENT
DRESSING: INDEPENDENT

## 2025-08-07 ASSESSMENT — COLUMBIA-SUICIDE SEVERITY RATING SCALE - C-SSRS: 1. IN THE PAST MONTH, HAVE YOU WISHED YOU WERE DEAD OR WISHED YOU COULD GO TO SLEEP AND NOT WAKE UP?: NO

## 2025-08-11 LAB
ALBUMIN/CREAT UR: NORMAL MG/G CREAT
CREAT UR-MCNC: 96 MG/DL (ref 20–275)
MICROALBUMIN UR-MCNC: <0.2 MG/DL

## 2025-08-14 RX ORDER — OXYBUTYNIN CHLORIDE 10 MG/1
10 TABLET, EXTENDED RELEASE ORAL DAILY
Qty: 30 TABLET | Refills: 11 | Status: SHIPPED | OUTPATIENT
Start: 2025-08-14 | End: 2026-08-14

## 2025-08-15 ENCOUNTER — TELEPHONE (OUTPATIENT)
Dept: PRIMARY CARE | Facility: CLINIC | Age: 80
End: 2025-08-15
Payer: MEDICARE

## 2025-08-15 LAB
LABORATORY COMMENT REPORT: NORMAL
PATH REPORT.FINAL DX SPEC: NORMAL
PATH REPORT.GROSS SPEC: NORMAL
PATH REPORT.RELEVANT HX SPEC: NORMAL
PATH REPORT.TOTAL CANCER: NORMAL

## 2025-08-16 LAB — TSH SERPL-ACNC: 2.14 MIU/L (ref 0.4–4.5)

## 2025-08-18 ENCOUNTER — HOSPITAL ENCOUNTER (OUTPATIENT)
Dept: RADIOLOGY | Facility: HOSPITAL | Age: 80
Discharge: HOME | End: 2025-08-18
Payer: MEDICARE

## 2025-08-18 DIAGNOSIS — K21.9 GASTROESOPHAGEAL REFLUX DISEASE, UNSPECIFIED WHETHER ESOPHAGITIS PRESENT: ICD-10-CM

## 2025-08-18 DIAGNOSIS — Z87.19 H/O HIATAL HERNIA: ICD-10-CM

## 2025-08-18 PROCEDURE — 74160 CT ABDOMEN W/CONTRAST: CPT | Performed by: RADIOLOGY

## 2025-08-18 PROCEDURE — 2550000001 HC RX 255 CONTRASTS: Performed by: STUDENT IN AN ORGANIZED HEALTH CARE EDUCATION/TRAINING PROGRAM

## 2025-08-18 PROCEDURE — 74160 CT ABDOMEN W/CONTRAST: CPT

## 2025-08-18 RX ADMIN — IOHEXOL 75 ML: 350 INJECTION, SOLUTION INTRAVENOUS at 10:44

## 2025-09-15 ENCOUNTER — APPOINTMENT (OUTPATIENT)
Dept: PRIMARY CARE | Facility: CLINIC | Age: 80
End: 2025-09-15
Payer: MEDICARE

## 2025-09-23 ENCOUNTER — APPOINTMENT (OUTPATIENT)
Dept: CARDIOLOGY | Facility: CLINIC | Age: 80
End: 2025-09-23
Payer: MEDICARE

## 2026-08-13 ENCOUNTER — APPOINTMENT (OUTPATIENT)
Dept: PRIMARY CARE | Facility: CLINIC | Age: 81
End: 2026-08-13
Payer: MEDICARE